# Patient Record
Sex: FEMALE | HISPANIC OR LATINO | URBAN - METROPOLITAN AREA
[De-identification: names, ages, dates, MRNs, and addresses within clinical notes are randomized per-mention and may not be internally consistent; named-entity substitution may affect disease eponyms.]

---

## 2018-10-10 ENCOUNTER — OFFICE VISIT (OUTPATIENT)
Dept: URBAN - METROPOLITAN AREA CLINIC 48 | Facility: CLINIC | Age: 28
End: 2018-10-10
Payer: COMMERCIAL

## 2018-10-10 PROCEDURE — 92014 COMPRE OPH EXAM EST PT 1/>: CPT | Performed by: OPHTHALMOLOGY

## 2018-10-10 PROCEDURE — 92134 CPTRZ OPH DX IMG PST SGM RTA: CPT | Performed by: OPHTHALMOLOGY

## 2018-10-10 ASSESSMENT — INTRAOCULAR PRESSURE
OS: 14
OD: 14

## 2018-10-10 NOTE — IMPRESSION/PLAN
Impression: Type 2 diabetes with stable prolif diabetic rtnop, left eye: E11.3552. stable, evidence of old PDR Plan: Advised to obtain good blood glucose control,

6 month DM OCT mac

## 2019-08-30 ENCOUNTER — OFFICE VISIT (OUTPATIENT)
Dept: URBAN - METROPOLITAN AREA CLINIC 48 | Facility: CLINIC | Age: 29
End: 2019-08-30
Payer: COMMERCIAL

## 2019-08-30 DIAGNOSIS — E11.3291 TYPE 2 DIAB W MILD NONPRLF DIABETIC RTNOP W/O MACULAR EDEMA, RIGHT EYE: Primary | ICD-10-CM

## 2019-08-30 PROCEDURE — 92134 CPTRZ OPH DX IMG PST SGM RTA: CPT | Performed by: OPHTHALMOLOGY

## 2019-08-30 PROCEDURE — 92014 COMPRE OPH EXAM EST PT 1/>: CPT | Performed by: OPHTHALMOLOGY

## 2019-08-30 ASSESSMENT — INTRAOCULAR PRESSURE
OD: 12
OS: 12

## 2019-08-30 NOTE — IMPRESSION/PLAN
Impression: Type 2 diab w mild nonprlf diabetic rtnop w/o macular edema, right eye: E09.5011. Plan: Advised to obtain good blood glucose control RTC 6 months DM

## 2019-08-30 NOTE — IMPRESSION/PLAN
Impression: Type 2 diabetes with stable prolif diabetic rtnop, left eye: M61.5365. Plan: Advised to obtain good blood glucose control RTC  6 months DM/OCt Kailee Hodges

## 2020-03-20 ENCOUNTER — OFFICE VISIT (OUTPATIENT)
Dept: URBAN - METROPOLITAN AREA CLINIC 48 | Facility: CLINIC | Age: 30
End: 2020-03-20
Payer: COMMERCIAL

## 2020-03-20 DIAGNOSIS — E11.3512 TYPE 2 DIABETES MELLITUS W/ PROLIFERATIVE DIABETIC RETINOPATHY W/ MACULAR EDEMA, LEFT EYE: ICD-10-CM

## 2020-03-20 DIAGNOSIS — E11.3511 TYPE 2 DIABETES MELLITUS W/ PROLIFERATIVE DIABETIC RETINOPATHY W/ MACULAR EDEMA, RIGHT EYE: ICD-10-CM

## 2020-03-20 PROCEDURE — 92004 COMPRE OPH EXAM NEW PT 1/>: CPT | Performed by: OPTOMETRIST

## 2020-03-20 ASSESSMENT — VISUAL ACUITY: OS: 20/400

## 2020-03-20 ASSESSMENT — INTRAOCULAR PRESSURE
OD: 15
OS: 14

## 2020-03-20 NOTE — IMPRESSION/PLAN
Impression: Type 2 diabetes mellitus w/ proliferative diabetic retinopathy w/ macular edema, right eye: e11.3511. Plan: Will do Eylea injection today. 

Monitor at next visit

## 2020-03-20 NOTE — IMPRESSION/PLAN
Impression: Type 2 diabetes mellitus w/ proliferative diabetic retinopathy w/ macular edema, left eye: R39.8842. Difficult to determine extent of retinopathy through [facility] Plan: Will do Eylea injection today Monitor at next visit
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
## 2020-03-23 ENCOUNTER — OFFICE VISIT (OUTPATIENT)
Dept: URBAN - METROPOLITAN AREA CLINIC 48 | Facility: CLINIC | Age: 30
End: 2020-03-23
Payer: COMMERCIAL

## 2020-03-23 PROCEDURE — 92014 COMPRE OPH EXAM EST PT 1/>: CPT | Performed by: OPTOMETRIST

## 2020-03-23 ASSESSMENT — INTRAOCULAR PRESSURE
OD: 12
OS: 10

## 2020-03-23 NOTE — IMPRESSION/PLAN
Impression: Vitreous hemorrhage, left eye: H43.12. Retina is still flat Plan: RD precautions discussed with patient.  

RTC Wednesday with Dr. Latanya Underwood

## 2020-03-25 ENCOUNTER — SURGERY (OUTPATIENT)
Dept: URBAN - METROPOLITAN AREA SURGERY 26 | Facility: SURGERY | Age: 30
End: 2020-03-25
Payer: COMMERCIAL

## 2020-03-25 ENCOUNTER — OFFICE VISIT (OUTPATIENT)
Dept: URBAN - METROPOLITAN AREA CLINIC 48 | Facility: CLINIC | Age: 30
End: 2020-03-25
Payer: COMMERCIAL

## 2020-03-25 DIAGNOSIS — H43.12 VITREOUS HEMORRHAGE, LEFT EYE: Primary | ICD-10-CM

## 2020-03-25 PROCEDURE — 67040 LASER TREATMENT OF RETINA: CPT | Performed by: OPHTHALMOLOGY

## 2020-03-25 PROCEDURE — 92134 CPTRZ OPH DX IMG PST SGM RTA: CPT | Performed by: OPHTHALMOLOGY

## 2020-03-25 PROCEDURE — 99213 OFFICE O/P EST LOW 20 MIN: CPT | Performed by: OPHTHALMOLOGY

## 2020-03-25 RX ORDER — OXYCODONE HYDROCHLORIDE AND ACETAMINOPHEN 5; 325 MG/1; MG/1
TABLET ORAL
Qty: 4 | Refills: 0 | Status: ACTIVE
Start: 2020-03-25

## 2020-03-25 ASSESSMENT — INTRAOCULAR PRESSURE
OD: 12
OD: 12
OS: 12
OS: 12

## 2020-03-25 NOTE — IMPRESSION/PLAN
Impression: Type 2 diabetes mellitus w/ proliferative diabetic retinopathy w/ macular edema, bilateral: D17.0956. Plan: Discussed diagnosis with patient, recommend Panretinal photocoagulation. The specific risk of PRP were discussed with the patient and include (but are not limited to) loss of peripheral and central vision, loss of ability to focus at near, pain, progressive macular edema, choroidal detachment and need for further treatment after discussing R/B/A of treatment including observation. The patient elects to proceed with PRP in the  left eye and we will continue to inject the right eye after surgery.   RL-2

## 2020-03-25 NOTE — IMPRESSION/PLAN
Impression: Vitreous hemorrhage, left eye: H43.12. W/ fibrotic traction s/p Eylea OS  W/ Dr Geiger  3/20/20 Plan: OCT ordered and performed today. Discussed diagnosis with patient. The clinical exam is consistent with Non clearing Vitreous Hemorrhage. Discussed treatment options with patient. Observation vs Vitrectomy. Recommend sx to clear Vitreous Hemorrhage. After a through discussion of surgical R/B/A, the patient elects to proceed with sx. The patient understands the potential risks of sx, including (but not limited to) bleeding, pain, infection, loss of vision, loss of eye and possible need for more sx. RL-2.

## 2020-03-26 ENCOUNTER — POST-OPERATIVE VISIT (OUTPATIENT)
Dept: URBAN - METROPOLITAN AREA CLINIC 48 | Facility: CLINIC | Age: 30
End: 2020-03-26

## 2020-03-26 PROCEDURE — 99024 POSTOP FOLLOW-UP VISIT: CPT | Performed by: OPTOMETRIST

## 2020-03-26 RX ORDER — PREDNISOLONE/GATIFLOX/NEPAFEN 1-0.5-0.1%
1 % SUSPENSION, DROPS(FINAL DOSAGE FORM)(ML) OPHTHALMIC (EYE)
Qty: 1 | Refills: 0 | Status: INACTIVE
Start: 2020-03-26 | End: 2021-03-24

## 2020-03-26 RX ORDER — OFLOXACIN 3 MG/ML
0.3 % SOLUTION/ DROPS OPHTHALMIC
Qty: 1 | Refills: 0 | Status: INACTIVE
Start: 2020-03-26 | End: 2021-03-24

## 2020-03-26 ASSESSMENT — INTRAOCULAR PRESSURE: OS: 13

## 2020-04-01 ENCOUNTER — POST-OPERATIVE VISIT (OUTPATIENT)
Dept: URBAN - METROPOLITAN AREA CLINIC 48 | Facility: CLINIC | Age: 30
End: 2020-04-01

## 2020-04-01 PROCEDURE — 99024 POSTOP FOLLOW-UP VISIT: CPT | Performed by: OPHTHALMOLOGY

## 2020-05-06 ENCOUNTER — POST-OPERATIVE VISIT (OUTPATIENT)
Dept: URBAN - METROPOLITAN AREA CLINIC 48 | Facility: CLINIC | Age: 30
End: 2020-05-06
Payer: COMMERCIAL

## 2020-05-06 DIAGNOSIS — Z09 ENCNTR FOR F/U EXAM AFT TRTMT FOR COND OTH THAN MALIG NEOPLM: Primary | ICD-10-CM

## 2020-05-06 PROCEDURE — 67028 INJECTION EYE DRUG: CPT | Performed by: OPHTHALMOLOGY

## 2020-05-06 PROCEDURE — 99024 POSTOP FOLLOW-UP VISIT: CPT | Performed by: OPHTHALMOLOGY

## 2020-09-23 ENCOUNTER — OFFICE VISIT (OUTPATIENT)
Dept: URBAN - METROPOLITAN AREA CLINIC 48 | Facility: CLINIC | Age: 30
End: 2020-09-23
Payer: COMMERCIAL

## 2020-09-23 DIAGNOSIS — E11.3592 TYPE 2 DIABETES MELLITUS W/ PROLIFERATIVE DIABETIC RETINOPATHY W/O MACULAR EDEMA, LEFT EYE: Primary | ICD-10-CM

## 2020-09-23 DIAGNOSIS — E11.3311 TYPE 2 DIAB W MODERATE NONPRLF DIAB RTNOP W MACULAR EDEMA, RIGHT EYE: ICD-10-CM

## 2020-09-23 PROCEDURE — 67028 INJECTION EYE DRUG: CPT | Performed by: OPHTHALMOLOGY

## 2020-09-23 PROCEDURE — 99213 OFFICE O/P EST LOW 20 MIN: CPT | Performed by: OPHTHALMOLOGY

## 2020-09-23 ASSESSMENT — INTRAOCULAR PRESSURE
OD: 17
OS: 15

## 2020-09-23 NOTE — IMPRESSION/PLAN
Impression: Type 2 diab w moderate nonprlf diab rtnop w macular edema, right eye: e11.3311. Right. Plan: OCT ordered and performed today. Discussed diagnosis in detail with patient. Discussed treatment options with patient. Discussed risks and benefits and patient understands. Recommend a one time intravitreal injection of Avastin in the right eye today. Patient understands the R/b/a's of injection and elects to proceed with Avastin OD.

## 2020-09-23 NOTE — IMPRESSION/PLAN
Impression: Type 2 diabetes mellitus w/ proliferative diabetic retinopathy w/o macular edema, left eye: F37.3197. Left. Plan: OCT ordered and performed today. The clinical exam is consistent with proliferative diabetic retinopathy. Discussed diagnosis with patient. Recommend close observation at this time. Discussed risk of progression with the present condition. The patient was advised to maintain tight blood sugar control, blood pressure and lipid control. Patient agrees with plan.

## 2020-10-30 ENCOUNTER — OFFICE VISIT (OUTPATIENT)
Dept: URBAN - METROPOLITAN AREA CLINIC 48 | Facility: CLINIC | Age: 30
End: 2020-10-30
Payer: COMMERCIAL

## 2020-10-30 PROCEDURE — 99213 OFFICE O/P EST LOW 20 MIN: CPT | Performed by: OPHTHALMOLOGY

## 2020-10-30 PROCEDURE — 92134 CPTRZ OPH DX IMG PST SGM RTA: CPT | Performed by: OPHTHALMOLOGY

## 2020-10-30 ASSESSMENT — INTRAOCULAR PRESSURE
OD: 13
OS: 12

## 2020-10-30 NOTE — IMPRESSION/PLAN
Impression: Type 2 diab w moderate nonprlf diab rtnop w macular edema, right eye: e11.3311. Right. Plan: OCT ordered and performed today,  Discussed diagnosis in detail with patient. Discussed treatment options with patient. Discussed risks and benefits and patient understands. Discussed possible injection at next visit. Decided to hold off this month and re -evaluate next month for possible continued Avastin injection.

## 2020-10-30 NOTE — IMPRESSION/PLAN
Impression: Type 2 diabetes mellitus w/ proliferative diabetic retinopathy w/o macular edema, left eye: T36.2151. Left. Plan: The clinical exam is consistent with proliferative diabetic retinopathy. Discussed diagnosis with patient. Recommend close observation at this time. Discussed risk of progression with the present condition. The patient was advised to maintain tight blood sugar control, blood pressure and lipid control. Patient agrees with plan.

## 2020-12-11 ENCOUNTER — OFFICE VISIT (OUTPATIENT)
Dept: URBAN - METROPOLITAN AREA CLINIC 48 | Facility: CLINIC | Age: 30
End: 2020-12-11
Payer: COMMERCIAL

## 2020-12-11 PROCEDURE — 99213 OFFICE O/P EST LOW 20 MIN: CPT | Performed by: OPHTHALMOLOGY

## 2020-12-11 PROCEDURE — 92134 CPTRZ OPH DX IMG PST SGM RTA: CPT | Performed by: OPHTHALMOLOGY

## 2020-12-11 ASSESSMENT — INTRAOCULAR PRESSURE
OS: 12
OD: 15

## 2020-12-11 NOTE — IMPRESSION/PLAN
Impression: Type 2 diabetes mellitus with proliferative diabetic retinopathy with macular edema, bilateral: C47.4779. Bilateral. Plan: OCT ordered and performed today. Discussed diagnosis in detail with patient. Discussed treatment options with patient. Discussed risks and benefits and patient understands. Recommend a one time intravitreal injection of Avastin OU. Patient understands and elects to proceed with injections.

## 2021-02-19 ENCOUNTER — OFFICE VISIT (OUTPATIENT)
Dept: URBAN - METROPOLITAN AREA CLINIC 48 | Facility: CLINIC | Age: 31
End: 2021-02-19
Payer: COMMERCIAL

## 2021-02-19 DIAGNOSIS — E11.3513 TYPE 2 DIABETES MELLITUS WITH PROLIFERATIVE DIABETIC RETINOPATHY WITH MACULAR EDEMA, BILATERAL: Primary | ICD-10-CM

## 2021-02-19 PROCEDURE — 99213 OFFICE O/P EST LOW 20 MIN: CPT | Performed by: OPHTHALMOLOGY

## 2021-02-19 PROCEDURE — 67028 INJECTION EYE DRUG: CPT | Performed by: OPHTHALMOLOGY

## 2021-02-19 PROCEDURE — 92134 CPTRZ OPH DX IMG PST SGM RTA: CPT | Performed by: OPHTHALMOLOGY

## 2021-02-19 ASSESSMENT — INTRAOCULAR PRESSURE
OS: 13
OD: 13

## 2021-02-19 NOTE — IMPRESSION/PLAN
Impression: Type 2 diabetes mellitus with proliferative diabetic retinopathy with macular edema, bilateral: A76.5173. Bilateral. Plan: OCT ordered and performed today. Discussed diagnosis in detail with patient. Discussed treatment options with patient. Discussed risks and benefits and patient understands. Recommend a one time intravitreal injection of Avastin OS. Patient understands and elects to proceed with injections.

## 2021-03-24 ENCOUNTER — OFFICE VISIT (OUTPATIENT)
Dept: URBAN - METROPOLITAN AREA CLINIC 48 | Facility: CLINIC | Age: 31
End: 2021-03-24
Payer: COMMERCIAL

## 2021-03-24 DIAGNOSIS — E11.3552 TYPE 2 DIABETES WITH STABLE PROLIF DIABETIC RTNOP, LEFT EYE: ICD-10-CM

## 2021-03-24 PROCEDURE — 67028 INJECTION EYE DRUG: CPT | Performed by: OPHTHALMOLOGY

## 2021-03-24 PROCEDURE — 99213 OFFICE O/P EST LOW 20 MIN: CPT | Performed by: OPHTHALMOLOGY

## 2021-03-24 ASSESSMENT — INTRAOCULAR PRESSURE
OD: 12
OS: 10

## 2021-03-24 NOTE — IMPRESSION/PLAN
Impression: Type 2 diabetes with stable prolif diabetic rtnop, left eye: Y86.8951. Plan: OCT ordered and performed today. Discussed diagnosis in detail with patient. Discussed treatment options with patient. Discussed risks and benefits and patient understands. The patient was advised to maintain tight blood sugar control, blood pressure and lipid control. Patient was also advised to keep all appointments with PCP for diabetic evaluation and counseling to avoid the systemic complications of diabetes.

## 2021-03-24 NOTE — IMPRESSION/PLAN
Impression: Type 2 diabetes mellitus with proliferative diabetic retinopathy with macular edema, right eye: D54.9797. Plan: OCT ordered and performed today. Discussed diagnosis in detail with patient. Discussed treatment options with patient. Discussed risks and benefits and patient understands. Pt. elects to proceed with one time Avastin inj OD today and re evaluate both eyes in 2 months.

## 2021-05-19 ENCOUNTER — OFFICE VISIT (OUTPATIENT)
Dept: URBAN - METROPOLITAN AREA CLINIC 48 | Facility: CLINIC | Age: 31
End: 2021-05-19
Payer: COMMERCIAL

## 2021-05-19 PROCEDURE — 99213 OFFICE O/P EST LOW 20 MIN: CPT | Performed by: OPHTHALMOLOGY

## 2021-05-19 PROCEDURE — 92134 CPTRZ OPH DX IMG PST SGM RTA: CPT | Performed by: OPHTHALMOLOGY

## 2021-05-19 ASSESSMENT — INTRAOCULAR PRESSURE
OD: 10
OS: 10

## 2021-05-19 NOTE — IMPRESSION/PLAN
Impression: Type 2 diabetes mellitus with proliferative diabetic retinopathy with macular edema, right eye: B87.7369. Plan: OCT ordered and performed today. Discussed diagnosis in detail with patient. Discussed treatment options with patient. Discussed risks and benefits and patient understands. Will obtain FA at next visit prior to [address] to determine nonprlf vs prlf. Pt. elects to proceed with observation at this time.

## 2021-05-19 NOTE — IMPRESSION/PLAN
Impression: Type 2 diabetes with stable prolif diabetic rtnop, left eye: Q41.9422. Plan: OCT ordered and performed today. Discussed diagnosis in detail with patient. Discussed treatment options with patient. Discussed risks and benefits and patient understands. The patient was advised to maintain tight blood sugar control, blood pressure and lipid control. Patient was also advised to keep all appointments with PCP for diabetic evaluation and counseling to avoid the systemic complications of diabetes.

## 2021-07-07 ENCOUNTER — OFFICE VISIT (OUTPATIENT)
Dept: URBAN - METROPOLITAN AREA CLINIC 48 | Facility: CLINIC | Age: 31
End: 2021-07-07
Payer: COMMERCIAL

## 2021-07-07 DIAGNOSIS — E11.3593 TYPE 2 DIABETES MELLITUS W/ PROLIFERATIVE DIABETIC RETINOPATHY W/O MACULAR EDEMA, BILATERAL: ICD-10-CM

## 2021-07-07 PROCEDURE — 99213 OFFICE O/P EST LOW 20 MIN: CPT | Performed by: OPHTHALMOLOGY

## 2021-07-07 PROCEDURE — 92134 CPTRZ OPH DX IMG PST SGM RTA: CPT | Performed by: OPHTHALMOLOGY

## 2021-07-07 ASSESSMENT — INTRAOCULAR PRESSURE
OS: 16
OD: 15

## 2021-07-07 NOTE — IMPRESSION/PLAN
Impression: Combined forms of age-related cataract, bilateral: H25.813. Bilateral. Plan: No retinal contraindication to CE IOL. Recommend patient have a CE consult, to evaluate the retina more sufficiently and determine if more treatment is needed. Patient understrands and agrees with the plan.

## 2021-07-07 NOTE — IMPRESSION/PLAN
Impression: Type 2 diabetes mellitus w/ proliferative diabetic retinopathy w/o macular edema, bilateral: O77.6475. Bilateral. Plan: OCT ordered and performed today. The clinical exam is consistent with proliferative diabetic retinopathy. Discussed diagnosis with patient. Recommend close observation at this time. Discussed risk of progression with the present condition. The patient was advised to maintain tight blood sugar control, blood pressure and lipid control. Patient agrees with plan. Recommended patient have CE consult prior to performing the FA, patient understands and agrees with the plan.

## 2021-09-23 ENCOUNTER — OFFICE VISIT (OUTPATIENT)
Dept: URBAN - METROPOLITAN AREA CLINIC 48 | Facility: CLINIC | Age: 31
End: 2021-09-23
Payer: COMMERCIAL

## 2021-09-23 DIAGNOSIS — H25.812 COMBINED FORMS OF AGE-RELATED CATARACT, LEFT EYE: ICD-10-CM

## 2021-09-23 DIAGNOSIS — H25.813 COMBINED FORMS OF AGE-RELATED CATARACT, BILATERAL: ICD-10-CM

## 2021-09-23 PROCEDURE — 92134 CPTRZ OPH DX IMG PST SGM RTA: CPT | Performed by: OPHTHALMOLOGY

## 2021-09-23 PROCEDURE — 92014 COMPRE OPH EXAM EST PT 1/>: CPT | Performed by: OPHTHALMOLOGY

## 2021-09-23 ASSESSMENT — KERATOMETRY
OS: 43.25
OD: 43.63

## 2021-09-23 ASSESSMENT — INTRAOCULAR PRESSURE
OD: 14
OS: 14

## 2021-09-23 ASSESSMENT — VISUAL ACUITY
OS: 20/40
OD: 20/60

## 2021-09-23 NOTE — IMPRESSION/PLAN
Impression: Combined forms of age-related cataract, bilateral: H25.813. Plan: The patient has a visually significant cataract in both eyes. After discussion with the patient and careful examination it has been determined that a cataract in both eyes is accounting for a significant amount of the patient's visual symptoms. Cataract surgery and the associated risks, benefits, alternatives, expectations, and recovery were discussed in detail with the patient. All questions were answered. The patient understands that there may be some limitation in visual potential given any pre-existing ocular disease. Discussed option of eye drops with patient, patient elects  Dexycu  , discussed possible side effects . The patient desires cataract surgery in both eyes. Schedule cataract surgery in both eyes; right eye, then left eye. RL 2 Patient a candidate for  Standard  lens target Surgeon: Dr. Tab Bray

## 2021-09-23 NOTE — IMPRESSION/PLAN
Impression: Type 2 diabetes mellitus w/ proliferative diabetic retinopathy w/ macular edema, bilateral: N17.0756. Plan: Advised to obtain good blood glucose control, discussed with patient will proceed with surgery but will continue to have consult with Dr. Stacy Sunshine. 



RTC 6 weeks DE/OCT mac with Dr. Stacy Sunshine

## 2021-10-05 ENCOUNTER — TESTING ONLY (OUTPATIENT)
Dept: URBAN - METROPOLITAN AREA CLINIC 48 | Facility: CLINIC | Age: 31
End: 2021-10-05
Payer: COMMERCIAL

## 2021-10-05 ASSESSMENT — PACHYMETRY
OD: 22.56
OS: 22.40
OS: 2.92
OD: 2.79

## 2021-10-05 ASSESSMENT — KERATOMETRY
OS: 43.87
OD: 43.49

## 2021-10-12 ENCOUNTER — SURGERY (OUTPATIENT)
Dept: URBAN - METROPOLITAN AREA SURGERY 26 | Facility: SURGERY | Age: 31
End: 2021-10-12
Payer: COMMERCIAL

## 2021-10-12 PROCEDURE — 66984 XCAPSL CTRC RMVL W/O ECP: CPT | Performed by: OPHTHALMOLOGY

## 2021-10-13 ENCOUNTER — POST-OPERATIVE VISIT (OUTPATIENT)
Dept: URBAN - METROPOLITAN AREA CLINIC 48 | Facility: CLINIC | Age: 31
End: 2021-10-13
Payer: COMMERCIAL

## 2021-10-13 PROCEDURE — 99024 POSTOP FOLLOW-UP VISIT: CPT | Performed by: STUDENT IN AN ORGANIZED HEALTH CARE EDUCATION/TRAINING PROGRAM

## 2021-10-13 RX ORDER — BRIMONIDINE TARTRATE 2 MG/ML
0.2 % SOLUTION/ DROPS OPHTHALMIC
Qty: 3.5 | Refills: 1 | Status: INACTIVE
Start: 2021-10-13 | End: 2022-01-05

## 2021-10-13 RX ORDER — PREDNISOLONE ACETATE 10 MG/ML
1 % SUSPENSION/ DROPS OPHTHALMIC
Qty: 3.5 | Refills: 1 | Status: INACTIVE
Start: 2021-10-13 | End: 2022-01-05

## 2021-10-13 ASSESSMENT — INTRAOCULAR PRESSURE: OD: 29

## 2021-10-13 NOTE — IMPRESSION/PLAN
Impression: S/P Cataract Extraction by phacoemulsification with IOL placement OD - 1 Day. Encounter for surgical aftercare following surgery on a sense organ  Z48.810. Plan: - Right eye is doing well, IOP slightly elevated as preventative will start Brim BID  
- Start PF QID for inflammation - Reviewed post op precautions with the patient including no bending past the waist, no heavy lifting, keeping the eye clean and dry, and  drops by at least 3 minutes
- RT/RD and endophthalmitis  precautions reviewed with the patient with strict RTC instructions

(e-rx sent to pharmacy today) RTC tomorrow for IOP check post op visit.

## 2021-10-14 ENCOUNTER — POST-OPERATIVE VISIT (OUTPATIENT)
Dept: URBAN - METROPOLITAN AREA CLINIC 48 | Facility: CLINIC | Age: 31
End: 2021-10-14
Payer: COMMERCIAL

## 2021-10-14 PROCEDURE — 99024 POSTOP FOLLOW-UP VISIT: CPT | Performed by: STUDENT IN AN ORGANIZED HEALTH CARE EDUCATION/TRAINING PROGRAM

## 2021-10-14 ASSESSMENT — INTRAOCULAR PRESSURE
OD: 20
OS: 17

## 2021-10-14 NOTE — IMPRESSION/PLAN
Impression: S/P Cataract Extraction by phacoemulsification with IOL placement OD - 2 Days. Encounter for surgical aftercare following surgery on a sense organ  Z48.810. Excellent post op course   Post operative instructions reviewed - Condition is improving - Plan: Inflamattion is improved IOP improved Continue pred qid and brimonidine BID 
RTC as scheduled

## 2021-10-19 ENCOUNTER — POST-OPERATIVE VISIT (OUTPATIENT)
Dept: URBAN - METROPOLITAN AREA CLINIC 48 | Facility: CLINIC | Age: 31
End: 2021-10-19
Payer: COMMERCIAL

## 2021-10-19 DIAGNOSIS — Z48.810 ENCOUNTER FOR SURGICAL AFTERCARE FOLLOWING SURGERY ON A SENSE ORGAN: Primary | ICD-10-CM

## 2021-10-19 PROCEDURE — 99024 POSTOP FOLLOW-UP VISIT: CPT | Performed by: OPHTHALMOLOGY

## 2021-10-19 RX ORDER — ERYTHROMYCIN 5 MG/G
OINTMENT OPHTHALMIC
Qty: 1 | Refills: 1 | Status: INACTIVE
Start: 2021-10-19 | End: 2022-01-05

## 2021-10-19 RX ORDER — KETOROLAC TROMETHAMINE 5 MG/ML
0.5 % SOLUTION OPHTHALMIC
Qty: 5 | Refills: 1 | Status: INACTIVE
Start: 2021-10-19 | End: 2022-01-05

## 2021-10-19 ASSESSMENT — INTRAOCULAR PRESSURE: OD: 14

## 2021-10-19 NOTE — IMPRESSION/PLAN
Impression: S/P Cataract Extraction by phacoemulsification with IOL placement OD - 7 Days. Encounter for surgical aftercare following surgery on a sense organ  Z48.810.  Post operative instructions reviewed - Condition is improving -naomi whit tid OD
PF tid 
ket tid Plan: keep appointment for surgery

## 2021-10-26 ENCOUNTER — SURGERY (OUTPATIENT)
Dept: URBAN - METROPOLITAN AREA SURGERY 26 | Facility: SURGERY | Age: 31
End: 2021-10-26
Payer: COMMERCIAL

## 2021-10-26 PROCEDURE — 66984 XCAPSL CTRC RMVL W/O ECP: CPT | Performed by: OPHTHALMOLOGY

## 2021-10-27 ENCOUNTER — POST-OPERATIVE VISIT (OUTPATIENT)
Dept: URBAN - METROPOLITAN AREA CLINIC 48 | Facility: CLINIC | Age: 31
End: 2021-10-27
Payer: COMMERCIAL

## 2021-10-27 PROCEDURE — 99024 POSTOP FOLLOW-UP VISIT: CPT | Performed by: OPHTHALMOLOGY

## 2021-10-27 ASSESSMENT — INTRAOCULAR PRESSURE
OD: 14
OS: 10

## 2021-10-27 NOTE — IMPRESSION/PLAN
Impression: S/P Cataract Extraction by phacoemulsification with IOL placement OS - 1 Day. Presence of intraocular lens  Z96.1. Plan: NO drops indicated at this time, had Dexycu
went over precautions with patient in room RTC 1wk PO2

## 2021-11-02 ENCOUNTER — POST-OPERATIVE VISIT (OUTPATIENT)
Dept: URBAN - METROPOLITAN AREA CLINIC 48 | Facility: CLINIC | Age: 31
End: 2021-11-02
Payer: COMMERCIAL

## 2021-11-02 DIAGNOSIS — Z96.1 PRESENCE OF INTRAOCULAR LENS: Primary | ICD-10-CM

## 2021-11-02 PROCEDURE — 99024 POSTOP FOLLOW-UP VISIT: CPT | Performed by: OPHTHALMOLOGY

## 2021-11-02 ASSESSMENT — INTRAOCULAR PRESSURE: OS: 9

## 2021-11-02 NOTE — IMPRESSION/PLAN
Impression: S/P Cataract Extraction by phacoemulsification with IOL placement OS - 7 Days. Presence of intraocular lens  Z96.1. Plan: Use Pred BID OS Continue: Brim BID OD
start: tapering Pred 2x1x then d/c

RTC 2wk PO

## 2021-11-23 ENCOUNTER — POST-OPERATIVE VISIT (OUTPATIENT)
Dept: URBAN - METROPOLITAN AREA CLINIC 48 | Facility: CLINIC | Age: 31
End: 2021-11-23
Payer: COMMERCIAL

## 2021-11-23 PROCEDURE — 92134 CPTRZ OPH DX IMG PST SGM RTA: CPT | Performed by: OPHTHALMOLOGY

## 2021-11-23 PROCEDURE — 99024 POSTOP FOLLOW-UP VISIT: CPT | Performed by: OPHTHALMOLOGY

## 2021-11-23 ASSESSMENT — INTRAOCULAR PRESSURE: OS: 12

## 2021-11-23 NOTE — IMPRESSION/PLAN
Impression: Type 2 diabetes mellitus w/ proliferative diabetic retinopathy w/ macular edema, bilateral: W63.5703. Plan: Increased ME noted OU. Patient reports DM is currently uncontrolled. Recommend Eylea sample to be done if possible Eylea OU X1 - Sample to be used To be done: Tomorrow
then RTC 5wk DE/OCT Mac

## 2021-11-24 ENCOUNTER — PROCEDURE (OUTPATIENT)
Dept: URBAN - METROPOLITAN AREA CLINIC 48 | Facility: CLINIC | Age: 31
End: 2021-11-24
Payer: COMMERCIAL

## 2022-01-05 ENCOUNTER — OFFICE VISIT (OUTPATIENT)
Dept: URBAN - METROPOLITAN AREA CLINIC 48 | Facility: CLINIC | Age: 32
End: 2022-01-05
Payer: COMMERCIAL

## 2022-01-05 PROCEDURE — 92134 CPTRZ OPH DX IMG PST SGM RTA: CPT | Performed by: STUDENT IN AN ORGANIZED HEALTH CARE EDUCATION/TRAINING PROGRAM

## 2022-01-05 PROCEDURE — 92014 COMPRE OPH EXAM EST PT 1/>: CPT | Performed by: STUDENT IN AN ORGANIZED HEALTH CARE EDUCATION/TRAINING PROGRAM

## 2022-01-05 RX ORDER — KETOROLAC TROMETHAMINE 5 MG/ML
0.5 % SOLUTION OPHTHALMIC
Qty: 5 | Refills: 1 | Status: ACTIVE
Start: 2022-01-05

## 2022-01-05 ASSESSMENT — INTRAOCULAR PRESSURE
OS: 12
OD: 12

## 2022-01-05 NOTE — IMPRESSION/PLAN
Impression: Type 2 diabetes mellitus w/ proliferative diabetic retinopathy w/ macular edema, bilateral: Y13.9443. OCT MAC Findings: OD improved IRF, OS Persistent IRF Plan: Reviewed and discussed testing w/pt. persistent IRF, recommend 1-2 repeat dilated exam with Dr. Triplett Estimable including OCT MAC and additional injection. OU Start Ket QID (erx sent to pharmacy today)

## 2022-02-03 ENCOUNTER — PROCEDURE (OUTPATIENT)
Dept: URBAN - METROPOLITAN AREA CLINIC 48 | Facility: CLINIC | Age: 32
End: 2022-02-03
Payer: COMMERCIAL

## 2022-02-03 DIAGNOSIS — E11.3313 TYPE 2 DIAB W MODERATE NONPRLF DIAB RTNOP W MACULAR EDEMA, BILATERAL: Primary | ICD-10-CM

## 2022-02-03 PROCEDURE — 92014 COMPRE OPH EXAM EST PT 1/>: CPT | Performed by: OPHTHALMOLOGY

## 2022-02-03 PROCEDURE — 92134 CPTRZ OPH DX IMG PST SGM RTA: CPT | Performed by: OPHTHALMOLOGY

## 2022-02-03 ASSESSMENT — INTRAOCULAR PRESSURE
OD: 10
OD: 10
OS: 13
OS: 13

## 2022-02-03 NOTE — IMPRESSION/PLAN
Impression: Type 2 diab w moderate nonprlf diab rtnop w macular edema, bilateral: V83.6915. Patient continue to have ME OU Plan: Advised to obtain good blood glucose control, recommend Injection OU x 2. Discussed possibility of focal OS in the future. Schedule Avastin OU  x2
#1 0-1 days #2 March 11th 



MAP OS in Laser suite , no block, next available please have OCT mac from 02/03/2022 Po1 will happen along with injections, to be determined

## 2022-02-10 ENCOUNTER — SURGERY (OUTPATIENT)
Dept: URBAN - METROPOLITAN AREA SURGERY 26 | Facility: SURGERY | Age: 32
End: 2022-02-10
Payer: COMMERCIAL

## 2022-02-10 PROCEDURE — 67210 TREATMENT OF RETINAL LESION: CPT | Performed by: OPHTHALMOLOGY

## 2022-10-12 ENCOUNTER — OFFICE VISIT (OUTPATIENT)
Dept: URBAN - METROPOLITAN AREA CLINIC 48 | Facility: CLINIC | Age: 32
End: 2022-10-12
Payer: COMMERCIAL

## 2022-10-12 DIAGNOSIS — E11.3552 DIABETES MELLITUS TYPE 2 WITH STABLE PROLIFERATIVE RETINOPATHY, LEFT EYE: ICD-10-CM

## 2022-10-12 DIAGNOSIS — E11.3511 DIABETES MELLITUS TYPE 2 WITH PROLIFERATIVE RETINOPATHY WITH MACULAR EDEMA, RIGHT EYE: Primary | ICD-10-CM

## 2022-10-12 PROCEDURE — 76512 OPH US DX B-SCAN: CPT | Performed by: OPHTHALMOLOGY

## 2022-10-12 PROCEDURE — 92134 CPTRZ OPH DX IMG PST SGM RTA: CPT | Performed by: OPHTHALMOLOGY

## 2022-10-12 PROCEDURE — 99214 OFFICE O/P EST MOD 30 MIN: CPT | Performed by: OPHTHALMOLOGY

## 2022-10-12 ASSESSMENT — INTRAOCULAR PRESSURE
OS: 16
OD: 16

## 2022-10-12 NOTE — IMPRESSION/PLAN
Impression: Diabetes mellitus Type 2 with stable proliferative retinopathy, left eye: N58.2124. Plan: Appears stable since MAP was done. Discussed the patient's vision complaints and how complaints are related to the lack of blood sugar control. Discussed ocular and systemic benefits of blood sugar control.

## 2022-10-12 NOTE — IMPRESSION/PLAN
Impression: Diabetes mellitus Type 2 with proliferative retinopathy with macular edema, right eye: E11.3511. Plan: Recommend evaluation with Dr. Herlinda Sanchez or other RCA specialist within 2 weeks. If not available, schedule w/in 2 weeks with Dr. Chastity Barahona, repeat Bscan

B scan- Dense pre-retinal heme Given patient history of non compliance, please schedule appointment to follow up with continued care. 
RTC 3-4mo DE/OCT Mac

## 2022-10-19 ENCOUNTER — OFFICE VISIT (OUTPATIENT)
Dept: URBAN - METROPOLITAN AREA CLINIC 48 | Facility: CLINIC | Age: 32
End: 2022-10-19
Payer: COMMERCIAL

## 2022-10-19 DIAGNOSIS — E11.3313 TYPE 2 DIABETES MELLITUS WITH MODERATE NONPROLIFERATIVE DIABETIC RETINOPATHY WITH MACULAR EDEMA, BILATERAL: ICD-10-CM

## 2022-10-19 DIAGNOSIS — E11.3513 TYPE 2 DIABETES MELLITUS WITH PROLIFERATIVE DIABETIC RETINOPATHY WITH MACULAR EDEMA, BILATERAL: Primary | ICD-10-CM

## 2022-10-19 DIAGNOSIS — H43.13 VITREOUS HEMORRHAGE, BILATERAL: ICD-10-CM

## 2022-10-19 DIAGNOSIS — Z96.1 PRESENCE OF INTRAOCULAR LENS: ICD-10-CM

## 2022-10-19 PROCEDURE — 92014 COMPRE OPH EXAM EST PT 1/>: CPT | Performed by: OPHTHALMOLOGY

## 2022-10-19 PROCEDURE — 67028 INJECTION EYE DRUG: CPT | Performed by: OPHTHALMOLOGY

## 2022-10-19 PROCEDURE — 92134 CPTRZ OPH DX IMG PST SGM RTA: CPT | Performed by: OPHTHALMOLOGY

## 2022-10-19 ASSESSMENT — INTRAOCULAR PRESSURE
OS: 14
OD: 16

## 2022-10-19 NOTE — IMPRESSION/PLAN
Impression: Type 2 diabetes mellitus with proliferative diabetic retinopathy with macular edema, bilateral: T19.7539. Bilateral.
-s/p PPV, PRP OS
-S/p Avastin OU 02/03/22 OCT: 
OD: [facility]; no obvious traction OS: atrophy Plan:   The diagnosis, natural history, and prognosis of PDR, as well as the risks and benefits of various treatment options including laser panretinal photocoagulation and Avastin, along with the alternatives of observation or participation in a clinical trial, were discussed at length. The patient understands that the goal of treatment is not to improve vision, but to reduce the likelihood of severe vision loss. The patient elects to proceed with Avastin OD. The patient was urged to work closely with their PCP to avoid systemic complications of diabetic disease. Pt denies any risk of pregnancy RTC 1 month DFE OU OCT OU Re-eval Avastin

## 2022-10-19 NOTE — IMPRESSION/PLAN
Impression: Vitreous hemorrhage, bilateral: H43.13 Bilateral.
 Plan: Patient with vitreous hemorrhage secondary to PDR. Hemorrhage has been persistent for 2-3 weeks. Reviewed treatment options with patient including observation and surgery. The patient wishes to be observed at this time. Discussed upright position and elevation of his/her head while sleeping. The patient has been instructed to call if condition or symptoms worsen. 

Consider PPV in future if heme does not clear

## 2022-11-16 ENCOUNTER — OFFICE VISIT (OUTPATIENT)
Dept: URBAN - METROPOLITAN AREA CLINIC 48 | Facility: CLINIC | Age: 32
End: 2022-11-16
Payer: COMMERCIAL

## 2022-11-16 DIAGNOSIS — H43.13 VITREOUS HEMORRHAGE, BILATERAL: ICD-10-CM

## 2022-11-16 DIAGNOSIS — E11.3513 TYPE 2 DIABETES MELLITUS WITH PROLIFERATIVE DIABETIC RETINOPATHY WITH MACULAR EDEMA, BILATERAL: Primary | ICD-10-CM

## 2022-11-16 DIAGNOSIS — Z96.1 PRESENCE OF INTRAOCULAR LENS: ICD-10-CM

## 2022-11-16 PROCEDURE — 92014 COMPRE OPH EXAM EST PT 1/>: CPT | Performed by: OPHTHALMOLOGY

## 2022-11-16 PROCEDURE — 92134 CPTRZ OPH DX IMG PST SGM RTA: CPT | Performed by: OPHTHALMOLOGY

## 2022-11-16 PROCEDURE — 67028 INJECTION EYE DRUG: CPT | Performed by: OPHTHALMOLOGY

## 2022-11-16 ASSESSMENT — INTRAOCULAR PRESSURE
OS: 14
OD: 13

## 2022-11-16 NOTE — IMPRESSION/PLAN
Impression: Type 2 diabetes mellitus with proliferative diabetic retinopathy with macular edema, bilateral: R91.2016. Bilateral.
-s/p PPV, PRP OS
-S/p Avastin OU 10/19/22 OCT: 11/16/22 OD: [facility]; No ERM, no obvious traction OS: atrophy Plan: The diagnosis, natural history, and prognosis of PDR, as well as the risks and benefits of various treatment options including laser panretinal photocoagulation and Avastin, along with the alternatives of observation or participation in a clinical trial, were discussed at length. The patient understands that the goal of treatment is not to improve vision, but to reduce the likelihood of severe vision loss. The patient elects to proceed with Avastin OD. The patient was urged to work closely with their PCP to avoid systemic complications of diabetic disease. Pt denies any risk of pregnancy RTC 1 month DFE OU OCT OU Re-eval Avastin

## 2022-11-16 NOTE — IMPRESSION/PLAN
Impression: Vitreous hemorrhage, bilateral: H43.13 Bilateral. Plan: Patient with vitreous hemorrhage secondary to PDR. Hemorrhage has been persistent for 3 months. Reviewed treatment options with patient including observation and surgery. Surgical Risks, Benefits, and Alternatives were discussed. Discussed with surgery the risk of infection, RD, RT, glaucoma, and hemorrhage may all lead to loss of VA or the eye. 

Rec: 25g PPV, MP, EL poss gas x PDR/VH OD (45 min, non-urgent)

## 2023-02-14 ENCOUNTER — POST-OPERATIVE VISIT (OUTPATIENT)
Dept: URBAN - METROPOLITAN AREA CLINIC 13 | Facility: CLINIC | Age: 33
End: 2023-02-14
Payer: COMMERCIAL

## 2023-02-14 DIAGNOSIS — H43.13 VITREOUS HEMORRHAGE, BILATERAL: ICD-10-CM

## 2023-02-14 DIAGNOSIS — Z48.810 ENCOUNTER FOR SURGICAL AFTERCARE FOLLOWING SURGERY ON A SENSE ORGAN: Primary | ICD-10-CM

## 2023-02-14 PROCEDURE — 99024 POSTOP FOLLOW-UP VISIT: CPT | Performed by: OPHTHALMOLOGY

## 2023-02-14 ASSESSMENT — INTRAOCULAR PRESSURE
OD: 12
OS: 12

## 2023-02-14 NOTE — IMPRESSION/PLAN
Impression: S/P 25g PPV, MP, EL poss gas x VH OD - 1 Day. Encounter for surgical aftercare following surgery on a sense organ  Z48.810. Plan: PF/Oflox QID OD. Gas precaution Sleep on left side.  

RTC 1 week POS DFE OD

## 2023-02-20 ENCOUNTER — POST-OPERATIVE VISIT (OUTPATIENT)
Facility: LOCATION | Age: 33
End: 2023-02-20
Payer: COMMERCIAL

## 2023-02-20 DIAGNOSIS — H43.13 VITREOUS HEMORRHAGE, BILATERAL: Primary | ICD-10-CM

## 2023-02-20 PROCEDURE — 99024 POSTOP FOLLOW-UP VISIT: CPT | Performed by: OPHTHALMOLOGY

## 2023-02-20 ASSESSMENT — INTRAOCULAR PRESSURE
OD: 13
OS: 10

## 2023-02-20 NOTE — IMPRESSION/PLAN
Impression: S/P 25g PPV, MP, EL poss gas x VH OD - 7 Days. Vitreous hemorrhage, bilateral  H43.13. Plan: Taper PF 3-2-1. Stop Oflox Gas precautions Sleep on either side.  

RTC 1 month POS DFE/OCT OD

## 2023-03-20 ENCOUNTER — POST-OPERATIVE VISIT (OUTPATIENT)
Facility: LOCATION | Age: 33
End: 2023-03-20
Payer: COMMERCIAL

## 2023-03-20 DIAGNOSIS — Z48.810 ENCOUNTER FOR SURGICAL AFTERCARE FOLLOWING SURGERY ON A SENSE ORGAN: Primary | ICD-10-CM

## 2023-03-20 DIAGNOSIS — H43.12 VITREOUS HEMORRHAGE, LEFT EYE: ICD-10-CM

## 2023-03-20 PROCEDURE — 99024 POSTOP FOLLOW-UP VISIT: CPT | Performed by: OPHTHALMOLOGY

## 2023-03-20 ASSESSMENT — INTRAOCULAR PRESSURE
OS: 12
OD: 15

## 2023-03-20 NOTE — IMPRESSION/PLAN
Impression: S/P 25g PPV, MP, EL poss gas x VH OD - 35 Days. Encounter for surgical aftercare following surgery on a sense organ  Z48.810. OCT: Flat OU  Plan: Doing well. Off drops RTC 3 month DFE/OCT re-eval OU

## 2023-05-16 ENCOUNTER — OFFICE VISIT (OUTPATIENT)
Dept: URBAN - METROPOLITAN AREA CLINIC 48 | Facility: CLINIC | Age: 33
End: 2023-05-16
Payer: COMMERCIAL

## 2023-05-16 DIAGNOSIS — H20.011 PRIMARY IRIDOCYCLITIS, RIGHT EYE: Primary | ICD-10-CM

## 2023-05-16 PROCEDURE — 92014 COMPRE OPH EXAM EST PT 1/>: CPT | Performed by: OPHTHALMOLOGY

## 2023-05-16 RX ORDER — PREDNISOLONE ACETATE 10 MG/ML
1 % SUSPENSION/ DROPS OPHTHALMIC
Qty: 5 | Refills: 1 | Status: ACTIVE
Start: 2023-05-16

## 2023-05-16 ASSESSMENT — INTRAOCULAR PRESSURE
OS: 14
OD: 14

## 2023-05-16 NOTE — IMPRESSION/PLAN
Impression: Primary iridocyclitis, right eye: H20.011. Plan: Patient had sx in Feb. pt. does have faint VH and questioning to report symptoms of photophobia. Mild AC reaction on the right not seen on the left with both eyes dilated. Trial of PF QID OD with follow up Fri. to see if alleviates symptoms Patient to call if worsening symptoms. If no improvement may consider dilation again if not better on Fri.

## 2023-05-19 ENCOUNTER — OFFICE VISIT (OUTPATIENT)
Dept: URBAN - METROPOLITAN AREA CLINIC 48 | Facility: CLINIC | Age: 33
End: 2023-05-19
Payer: COMMERCIAL

## 2023-05-19 DIAGNOSIS — H20.9 IRIDOCYCLITIS: Primary | ICD-10-CM

## 2023-05-19 PROCEDURE — 99212 OFFICE O/P EST SF 10 MIN: CPT | Performed by: OPHTHALMOLOGY

## 2023-05-19 ASSESSMENT — INTRAOCULAR PRESSURE
OD: 15
OS: 11

## 2023-05-19 NOTE — IMPRESSION/PLAN
Impression: Iridocyclitis: H20.9. Plan: patent still with rare cell with out dilation, Symptoms have improved. PF 6 times a day to see if [facility] reaction improves RTC 10-12 days follow up Keep appointment with Dr. Warren Cleaves Itraconazole Counseling:  I discussed with the patient the risks of itraconazole including but not limited to liver damage, nausea/vomiting, neuropathy, and severe allergy.  The patient understands that this medication is best absorbed when taken with acidic beverages such as non-diet cola or ginger ale.  The patient understands that monitoring is required including baseline LFTs and repeat LFTs at intervals.  The patient understands that they are to contact us or the primary physician if concerning signs are noted.

## 2023-06-28 ENCOUNTER — OFFICE VISIT (OUTPATIENT)
Facility: LOCATION | Age: 33
End: 2023-06-28
Payer: MEDICARE

## 2023-06-28 DIAGNOSIS — Z96.1 PRESENCE OF INTRAOCULAR LENS: ICD-10-CM

## 2023-06-28 DIAGNOSIS — Z48.810 ENCOUNTER FOR SURGICAL AFTERCARE FOLLOWING SURGERY ON THE SENSE ORGANS: ICD-10-CM

## 2023-06-28 DIAGNOSIS — E11.3513 TYPE 2 DIABETES MELLITUS WITH PROLIFERATIVE DIABETIC RETINOPATHY WITH MACULAR EDEMA, BILATERAL: Primary | ICD-10-CM

## 2023-06-28 DIAGNOSIS — H43.13 VITREOUS HEMORRHAGE, BILATERAL: ICD-10-CM

## 2023-06-28 PROCEDURE — 99213 OFFICE O/P EST LOW 20 MIN: CPT | Performed by: OPHTHALMOLOGY

## 2023-06-28 PROCEDURE — 92134 CPTRZ OPH DX IMG PST SGM RTA: CPT | Performed by: OPHTHALMOLOGY

## 2023-06-28 ASSESSMENT — INTRAOCULAR PRESSURE
OD: 16
OS: 17

## 2023-06-28 NOTE — IMPRESSION/PLAN
Impression: Type 2 diabetes mellitus with proliferative diabetic retinopathy with macular edema, bilateral: C51.6731. Bilateral.
-s/p PPV OU
-S/p Avastin OU 11/16/22 OCT: 06/28/23 OD: flat OS: atrophy Plan: Thorough examination reveals quiescent proliferative diabetic retinopathy following panretinal photocoagulation. The importance of blood sugar, blood pressure, and cholesterol control; and their relationship to progression of diabetic retinopathy were reviewed with the patient. The patient was urged to work closely with their PCP to avoid systemic complications of diabetic disease. 

RTC 6 months DFE OU OC PRAVEEN